# Patient Record
Sex: MALE | Race: WHITE
[De-identification: names, ages, dates, MRNs, and addresses within clinical notes are randomized per-mention and may not be internally consistent; named-entity substitution may affect disease eponyms.]

---

## 2018-07-29 ENCOUNTER — HOSPITAL ENCOUNTER (OUTPATIENT)
Dept: HOSPITAL 76 - ED | Age: 58
Setting detail: OBSERVATION
LOS: 1 days | Discharge: HOME | End: 2018-07-30
Attending: NURSE PRACTITIONER | Admitting: INTERNAL MEDICINE
Payer: MEDICARE

## 2018-07-29 ENCOUNTER — HOSPITAL ENCOUNTER (OUTPATIENT)
Dept: HOSPITAL 76 - EMS | Age: 58
Discharge: ANOTHER HOSPITAL | End: 2018-07-29
Attending: SURGERY
Payer: MEDICARE

## 2018-07-29 DIAGNOSIS — Z83.3: ICD-10-CM

## 2018-07-29 DIAGNOSIS — R73.9: ICD-10-CM

## 2018-07-29 DIAGNOSIS — N18.3: ICD-10-CM

## 2018-07-29 DIAGNOSIS — E03.9: ICD-10-CM

## 2018-07-29 DIAGNOSIS — R42: Primary | ICD-10-CM

## 2018-07-29 DIAGNOSIS — R11.0: ICD-10-CM

## 2018-07-29 DIAGNOSIS — T45.1X5A: ICD-10-CM

## 2018-07-29 DIAGNOSIS — Z79.82: ICD-10-CM

## 2018-07-29 DIAGNOSIS — Z79.899: ICD-10-CM

## 2018-07-29 DIAGNOSIS — Z94.4: ICD-10-CM

## 2018-07-29 DIAGNOSIS — H55.09: ICD-10-CM

## 2018-07-29 DIAGNOSIS — R11.2: Primary | ICD-10-CM

## 2018-07-29 DIAGNOSIS — R42: ICD-10-CM

## 2018-07-29 LAB
ALBUMIN DIAFP-MCNC: 4.2 G/DL (ref 3.2–5.5)
ALBUMIN/GLOB SERPL: 1.4 {RATIO} (ref 1–2.2)
ALP SERPL-CCNC: 53 IU/L (ref 42–121)
ALT SERPL W P-5'-P-CCNC: 42 IU/L (ref 10–60)
ANION GAP SERPL CALCULATED.4IONS-SCNC: 8 MMOL/L (ref 6–13)
AST SERPL W P-5'-P-CCNC: 38 IU/L (ref 10–42)
BASOPHILS NFR BLD AUTO: 0 10^3/UL (ref 0–0.1)
BASOPHILS NFR BLD AUTO: 0.4 %
BILIRUB BLD-MCNC: 2.7 MG/DL (ref 0.2–1)
BUN SERPL-MCNC: 26 MG/DL (ref 6–20)
CALCIUM UR-MCNC: 9.3 MG/DL (ref 8.5–10.3)
CHLORIDE SERPL-SCNC: 103 MMOL/L (ref 101–111)
CO2 SERPL-SCNC: 26 MMOL/L (ref 21–32)
CREAT SERPLBLD-SCNC: 1.4 MG/DL (ref 0.6–1.2)
EOSINOPHIL # BLD AUTO: 0.1 10^3/UL (ref 0–0.7)
EOSINOPHIL NFR BLD AUTO: 1 %
ERYTHROCYTE [DISTWIDTH] IN BLOOD BY AUTOMATED COUNT: 20.2 % (ref 12–15)
GFRSERPLBLD MDRD-ARVRAT: 52 ML/MIN/{1.73_M2} (ref 89–?)
GLOBULIN SER-MCNC: 3 G/DL (ref 2.1–4.2)
GLUCOSE SERPL-MCNC: 153 MG/DL (ref 70–100)
HGB UR QL STRIP: 11 G/DL (ref 14–18)
LIPASE SERPL-CCNC: 44 U/L (ref 22–51)
LYMPHOCYTES # SPEC AUTO: 0.6 10^3/UL (ref 1.5–3.5)
LYMPHOCYTES NFR BLD AUTO: 9.2 %
MCH RBC QN AUTO: 31.3 PG (ref 27–31)
MCHC RBC AUTO-ENTMCNC: 35 G/DL (ref 32–36)
MCV RBC AUTO: 89.5 FL (ref 80–94)
MONOCYTES # BLD AUTO: 0.3 10^3/UL (ref 0–1)
MONOCYTES NFR BLD AUTO: 4.9 %
NEUTROPHILS # BLD AUTO: 5.9 10^3/UL (ref 1.5–6.6)
NEUTROPHILS # SNV AUTO: 6.9 X10^3/UL (ref 4.8–10.8)
NEUTROPHILS NFR BLD AUTO: 84.5 %
PDW BLD AUTO: 8.3 FL (ref 7.4–11.4)
PLATELET # BLD: 134 10^3/UL (ref 130–450)
PLATELET BLD QL SMEAR: (no result)
PROT SPEC-MCNC: 7.2 G/DL (ref 6.7–8.2)
RBC MAR: 3.52 10^6/UL (ref 4.7–6.1)
RBC MORPH BLD: (no result)
SODIUM SERPLBLD-SCNC: 137 MMOL/L (ref 135–145)

## 2018-07-29 PROCEDURE — 96375 TX/PRO/DX INJ NEW DRUG ADDON: CPT

## 2018-07-29 PROCEDURE — 99284 EMERGENCY DEPT VISIT MOD MDM: CPT

## 2018-07-29 PROCEDURE — 96365 THER/PROPH/DIAG IV INF INIT: CPT

## 2018-07-29 PROCEDURE — 96372 THER/PROPH/DIAG INJ SC/IM: CPT

## 2018-07-29 PROCEDURE — 70551 MRI BRAIN STEM W/O DYE: CPT

## 2018-07-29 PROCEDURE — 85025 COMPLETE CBC W/AUTO DIFF WBC: CPT

## 2018-07-29 PROCEDURE — 85610 PROTHROMBIN TIME: CPT

## 2018-07-29 PROCEDURE — 96376 TX/PRO/DX INJ SAME DRUG ADON: CPT

## 2018-07-29 PROCEDURE — 84443 ASSAY THYROID STIM HORMONE: CPT

## 2018-07-29 PROCEDURE — 84100 ASSAY OF PHOSPHORUS: CPT

## 2018-07-29 PROCEDURE — 83735 ASSAY OF MAGNESIUM: CPT

## 2018-07-29 PROCEDURE — 99285 EMERGENCY DEPT VISIT HI MDM: CPT

## 2018-07-29 PROCEDURE — 96361 HYDRATE IV INFUSION ADD-ON: CPT

## 2018-07-29 PROCEDURE — 83036 HEMOGLOBIN GLYCOSYLATED A1C: CPT

## 2018-07-29 PROCEDURE — 83605 ASSAY OF LACTIC ACID: CPT

## 2018-07-29 PROCEDURE — 36415 COLL VENOUS BLD VENIPUNCTURE: CPT

## 2018-07-29 PROCEDURE — 80053 COMPREHEN METABOLIC PANEL: CPT

## 2018-07-29 PROCEDURE — 83690 ASSAY OF LIPASE: CPT

## 2018-07-29 RX ADMIN — SODIUM CHLORIDE SCH MLS/HR: 9 INJECTION, SOLUTION INTRAVENOUS at 21:38

## 2018-07-29 NOTE — ED PHYSICIAN DOCUMENTATION
History of Present Illness





- Stated complaint


Stated Complaint: VERTIGO





- Chief complaint


Chief Complaint: General





- History obtained from


History obtained from: Patient, Family, EMS





- History of Present Illness


Timing: Today


Pain level max: 0


Pain level now: 0


Improved by: rest


Worsened by: movement.





- Additonal information


Additional information: 





states history of vertigo. States felt sudden onset of vertigo today. Vomiting. 

Room spinning. No fevers. No focal weakness. Has a history of liver transplant 

in 2014.  Recently moved from [place]. worse with movement. Better lying still. 





Review of Systems


Ten Systems: 10 systems reviewed and negative


Constitutional: denies: Fever, Chills


Ears: denies: Ear pain


Nose: denies: Rhinorrhea / runny nose, Congestion


Throat: denies: Sore throat


Cardiac: denies: Chest pain / pressure


Respiratory: denies: Cough


GI: reports: Nausea, Vomiting.  denies: Abdominal Pain, Diarrhea


Skin: denies: Rash


Musculoskeletal: denies: Neck pain, Back pain


Neurologic: denies: Headache





PD PAST MEDICAL HISTORY





- Past Medical History


Past Medical History: Yes


GI: Other


Other Past Medical History: Liver transplant 2014





- Past Surgical History


General: Liver surgery





- Allergies


Allergies/Adverse Reactions: 


 Allergies











Allergy/AdvReac Type Severity Reaction Status Date / Time


 


No Known Drug Allergies Allergy   Verified 07/29/18 13:18














- Social History


Does the pt smoke?: No


Smoking Status: Never smoker


Does the pt drink ETOH?: No


Does the pt have substance abuse?: No





- Immunizations


Immunizations are current?: Yes





PD ED PE NORMAL





- Vitals


Vital signs reviewed: Yes





- General


General: Alert and oriented X 3, No acute distress





- HEENT


HEENT: Moist mucous membranes, Other (Horizontal nystagmus present.  Worsens 

with any movement)





- Neck


Neck: Supple, no meningeal sign





- Cardiac


Cardiac: RRR, Strong equal pulses





- Respiratory


Respiratory: No respiratory distress, Clear bilaterally





- Abdomen


Abdomen: Soft, Non tender, Non distended





- Back


Back: No spinal TTP





- Derm


Derm: Warm and dry





- Extremities


Extremities: No edema





- Neuro


Neuro: Alert and oriented X 3, CNs 2-12 intact, No motor deficit, No sensory 

deficit, Normal speech





- Psych


Psych: Normal mood, Normal affect





Results





- Vitals


Vitals: 


 Vital Signs - 24 hr











  07/29/18 07/29/18 07/29/18





  13:13 14:10 15:30


 


Temperature 35 C L  


 


Heart Rate 71 59 L 82


 


Respiratory 16 16 16





Rate   


 


Blood Pressure 150/83 H 149/73 H 134/69 H


 


O2 Saturation 99 100 100














  07/29/18 07/29/18 07/29/18





  16:59 18:00 18:58


 


Temperature   


 


Heart Rate 82 78 81


 


Respiratory 16  15





Rate   


 


Blood Pressure 125/71 127/77 122/80


 


O2 Saturation 97 97 97














  07/29/18





  19:17


 


Temperature 


 


Heart Rate 85


 


Respiratory 16





Rate 


 


Blood Pressure 122/84 H


 


O2 Saturation 97








 Oxygen











O2 Source                      Room air

















- Labs


Labs: 


 Laboratory Tests











  07/29/18 07/29/18





  13:53 13:53


 


WBC  6.9 


 


RBC  3.52 L 


 


Hgb  11.0 L 


 


Hct  31.5 L 


 


MCV  89.5 


 


MCH  31.3 H 


 


MCHC  35.0 


 


RDW  20.2 H 


 


Plt Count  134 


 


MPV  8.3 


 


Neut # (Auto)  5.9 


 


Lymph # (Auto)  0.6 L 


 


Mono # (Auto)  0.3 


 


Eos # (Auto)  0.1 


 


Baso # (Auto)  0.0 


 


Absolute Nucleated RBC  0.00 


 


Nucleated RBC %  0.0 


 


Manual Slide Review  Indicated 


 


Platelet Estimate  NORMAL (130-450,000) 


 


RBC Morph Micro Appear  1+ MICROCYTOSIS 


 


Sodium   137


 


Potassium   3.8


 


Chloride   103


 


Carbon Dioxide   26


 


Anion Gap   8.0


 


BUN   26 H


 


Creatinine   1.4 H


 


Estimated GFR (MDRD)   52 L


 


Glucose   153 H


 


Calcium   9.3


 


Total Bilirubin   2.7 H


 


AST   38


 


ALT   42


 


Alkaline Phosphatase   53


 


Total Protein   7.2


 


Albumin   4.2


 


Globulin   3.0


 


Albumin/Globulin Ratio   1.4


 


Lipase   44














PD MEDICAL DECISION MAKING





- ED course


Complexity details: reviewed results, re-evaluated patient, considered 

differential, d/w patient, d/w family, d/w consultant


ED course: 





Patient is a 57-year-old male who has a long-standing history of vertigo.  

Symptoms today are consistent with peripheral vertigo.  Nystagmus is 

horizontal.  Was given IV Ativan, Phenergan, Benadryl.  His nausea improved, 

but still has significant nights nystagmus.  Still is very nauseated when he 

opens his eyes.  Head CT was ordered, but the CT scanner went down unexpectedly 

and this cannot be performed today. MRI would likely be a better study anyway 

for potential cerebellar issues.  As he is improving with medications, we will 

place him in observation with the hospitalist and have him reevaluated after 

receiving more medications.  Concerned that he will not be able to keep his anti

-rejection medications down. Discussed the case with Dr. Romano who accepts





This document was made in part using voice recognition software. While efforts 

are made to proofread this document, sound alike and grammatical errors may 

occur.





- Sepsis Event


Vital Signs: 


 Vital Signs - 24 hr











  07/29/18 07/29/18 07/29/18





  13:13 14:10 15:30


 


Temperature 35 C L  


 


Heart Rate 71 59 L 82


 


Respiratory 16 16 16





Rate   


 


Blood Pressure 150/83 H 149/73 H 134/69 H


 


O2 Saturation 99 100 100














  07/29/18 07/29/18 07/29/18





  16:59 18:00 18:58


 


Temperature   


 


Heart Rate 82 78 81


 


Respiratory 16  15





Rate   


 


Blood Pressure 125/71 127/77 122/80


 


O2 Saturation 97 97 97














  07/29/18





  19:17


 


Temperature 


 


Heart Rate 85


 


Respiratory 16





Rate 


 


Blood Pressure 122/84 H


 


O2 Saturation 97








 Oxygen











O2 Source                      Room air

















Departure





- Departure


Disposition: ED Place in Observation


Clinical Impression: 


 Vertigo





Vomiting


Qualifiers:


 Vomiting type: unspecified Vomiting Intractability: intractable Nausea presence

: with nausea Qualified Code(s): R11.2 - Nausea with vomiting, unspecified





Condition: Stable

## 2018-07-29 NOTE — HISTORY & PHYSICAL EXAMINATION
Chief Complaint





- Chief Complaint


Chief Complaint: Vertigo, nausea and vomiting





History of Present Illness





- Admitted From


Admitted From:: Emergency department





- History Obtained From


Records Reviewed: Yes


History obtained from: Patient


Exam Limitations: None





- History of Present Illness


HPI Comment/Other: 





Patient is a's 57-year-old gentleman with a past medical history significant 

for liver failure of unknown etiology status post liver transplant in  at 

[facility] with complication of bile duct blockage 

requiring stents every 3 months until earlier this year when the patient had a 

bypass of the bile duct, tacrolimus-induced chronic kidney disease and 

hypothyroidism who presented to the emergency department with a chief complaint 

of vertigo, nausea and vomiting.  The patient states that he was in his normal 

state of health until about 1230 this afternoon when he had just finished 

working on his computer and walked into the bathroom.  He states that he use 

the bathroom and then zipped up his pants when he began to notice that the room 

was spinning around him.  He states this was very severe and he had to hold 

onto the toilet just to keep himself from falling over.  He states he became 

nauseated and had emesis.  His wife called 911 and the paramedics came to the 

scene.  The patient states that he has had a similar episode once before 

earlier this year.  He states that at that time he had the exact same symptoms 

and also had to call 911 but his symptoms resolved very quickly after he 

received Zofran and IV fluids.  He states that he did have to stay overnight at 

the hospital in [place], [place] but had no further symptoms.  The patient 

denies having had any fevers or chills.  The patient denies any ringing in his 

ear or recent upper respiratory infection.  The patient describes vertigo that 

is worse with certain positions.  He states at this point the vertigo is so bad 

he cannot even open his eyes because he will become nauseated and vomit.  The 

patient denies any headache or any focal neurologic deficits.  The patient 

denies any abdominal pain, diarrhea or any jaundice.  Patient denies any black 

or tarry stools.  The patient denies any pale stools.





The patient denies any blurred vision, runny nose, sore throat, nasal congestion

, difficulty swallowing, chest pain, cough, shortness of air, orthopnea, PND, 

increased lower extremity swelling, urinary urgency, urinary frequency, dysuria

, joint pain, joint swelling, muscle aches, back pain, neck stiffness, recent 

unintentional weight loss, changes in his appetite, hair loss, skin rash, night 

sweats, polyuria or polydipsia.





The patient states that he has recently moved to [place] where his son lives 

and is in the [employer].  He states that his previous care for liver transplant was 

at [facility] and since moving here he has had an appointment at the [facility] with their transplant team.  The patient states that he is stable 

from the issue of his transplant and only requires follow-up once a year with 

the transplant team.  Otherwise he follows up with his primary care physician 

for routine lab work.  The patient states that ever since his liver transplant 

his bilirubin has been slightly elevated but has remained stable.





On presentation to the emergency department the patient was afebrile and 

slightly hypertensive but otherwise had normal vital signs.  The patient was in 

severe distress as he could not keep his eyes open due to vertigo and severe 

nausea.  The patient was treated in the emergency department with IV fluids, IV 

Zofran, IV Phenergan, IV Benadryl, IV Ativan and oral meclizine.  The patient 

had almost no response to all the medication he received in the emergency 

department.  As soon as the patient opens his eyes he begins to have severe 

vertigo and becomes nauseated and vomits.  The patient's lab work revealed a 

creatinine of 1.4, the patient is known to have chronic kidney disease but did 

have an elevated BUN of 26 and appeared to be dry on exam.  The patient's 

bilirubin was elevated at 2.7 although we do not have a baseline bilirubin the 

patient does admit to having elevated bilirubin since his transplant.  The 

patient was also hyperglycemic with a blood glucose of 153, he states he has 

never been told he has diabetes or borderline diabetes but does have a family 

history of diabetes.  The patient was slightly anemic but otherwise had no 

leukocytosis and remainder of his electrolytes were within normal limits.  

Given that after 6 hours in the emergency department the patient continued to 

have symptoms despite multiple rounds of medication it was decided that the 

patient should be placed in observation for supportive care and further 

management of his symptoms.





History





- Past Medical History


Endocrine/Autoimmune: reports: HyPOthyroidism


GI: reports: Other (Liver transplant in 2014 for Liver failure of unknown 

etiology, s/p bile duct bypass )


: reports: Renal insuffiency


MRSA Hx?: No


Other Past Medical History: Liver transplant 2014





- Past Surgical History


General: reports: Liver surgery





- Family & Social History


Family History: Father: , CAD, CVA/TIA, Diabetes, Type 2, MI, Brother: 

Diabetes, Type 2


Living arrangement: At home


Living Situation: With spouse/s.o., With family


Social History Notes: The patient currently lives in [place] with his wife 

and his son.  Patient's son is in the [place] and had a large home therefore the 

patient and his wife decided to move up to live with her son.  Prior to this 

the patient and his wife live near [place], [place] and the patient was seen at 

[facility] for his transplant care.  The patient has established care at the 

[facility] for his liver transplant since moving here 2 [place].  The patient is retired [employer].  He denies any tobacco use, alcohol use 

or any drug use.





- POLST


Patient has POLST: No


POLST Status: Full Code





Meds/Allgy





- Home Medications


Home Medications: 


 Ambulatory Orders











 Medication  Instructions  Recorded  Confirmed


 


Aspirin [Aspirin EC] 81 mg PO DAILY 18


 


Calcium Carbonate [[id number]] 500 mg PO TID 18


 


Cholecalciferol (Vitamin D3) 2,000 unit PO DAILY 18





[Vitamin D]   


 


Levothyroxine Sodium [Synthroid] 175 mg PO QDAC 18


 


Magnesium Oxide 400 mg PO DAILY 18


 


Multivitamin [Multivitamins] 1 tab PO DAILY 18


 


Tacrolimus [Prograf] 2 mg PO BID 18


 


azaTHIOprine [Azathioprine] 100 mg PO QPM 18


 


oxyCODONE [Roxicodone] 5 mg PO Q6H PRN 18














- Allergies


Allergies/Adverse Reactions: 


 Allergies











Allergy/AdvReac Type Severity Reaction Status Date / Time


 


No Known Drug Allergies Allergy   Verified 18 13:18














Review of Systems





- Other Findings


Other Findings: 





A comprehensive review of systems was performed the pertinent positives and 

negatives are stated above in the HPI and the remainder of the review of 

systems is negative.





Exam





- Vital Signs


Reviewed Vital Signs: Yes


Vital Signs: 





 Vital Signs x48h











  Temp Pulse Resp BP Pulse Ox


 


 18 19:17   85  16  122/84 H  97


 


 18 18:58   81  15  122/80  97


 


 18 18:00   78   127/77  97


 


 18 16:59   82  16  125/71  97


 


 18 15:30   82  16  134/69 H  100


 


 18 14:10   59 L  16  149/73 H  100


 


 18 13:13  35 C L  71  16  150/83 H  99














- Physical Exam


General Appearance: positive: Alert, Moderate distress (Patient keeping his 

eyes closed secondary to severe vertigo and nausea)


Eyes Bilateral: positive: Normal inspection, PERRL, EOMI, No lid inflammation, 

Conjunctivae nml, No scleral icterus, Other (Horizontal nystagmus)


ENT: positive: ENT inspection nml, Pharynx nml, Dry mucous membranes.  negative

: Purulent nasal drainage, Pharyngeal erythema, Oral lesions


Neck: positive: Nml inspection, Thyroid nml, No JVD, Trachea midline.  negative

: Thyromegaly, Lymphadenopathy (R), Lymphadenopathy (L), Carotid bruit, 

Tracheal deviation


Respiratory: positive: Chest non-tender, No respiratory distress, Breath sounds 

nml.  negative: Wheezes, Rales, Rhonchi


Cardiovascular: positive: Regular rate & rhythm, No murmur, No gallop


Peripheral Pulses: positive: 2+


Abdomen: positive: Non-tender, No organomegaly, Nml bowel sounds, No 

distention.  negative: Guarding, Rebound, Hepatomegaly


Back: positive: Nml inspection.  negative: CVA tenderness (R), CVA tenderness (L

)


Skin: positive: Color nml, No rash, Warm, Dry.  negative: Cyanosis, Diaphoresis

, Pallor, Skin rash


Extremities: positive: Non-tender, Full ROM, Nml appearance, No pedal edema


Neurologic/Psychiatric: positive: Oriented x3, CN's nml (2-12), Motor nml, 

Sensation nml, Mood/affect nml





Conclusion/Plan





- Problem List


(1) Intractable nausea and vomiting


Conclusion/Plan: 





The patient presented to the emergency department secondary to vertigo, nausea 

and vomiting.  The patient appears to have intractable nausea and vomiting as 

we were unable to control his nausea and vomiting while he was in emergency 

department despite multiple treatments with medication.  The patient's 

intractable nausea vomiting appears likely to be secondary to his vertigo.  The 

patient does not appear to have any gastroenteritis or abdominal etiology of 

his nausea and vomiting.  The patient has no diarrhea and no abdominal pain.  

The patient does have a mildly elevated bilirubin however according to him this 

is elevated at baseline.  This does not appear to be rejection of his 

transplant.





Plan:


We will monitor patient closely for development of any abdominal pain or any 

symptoms of transplant rejection.  We will monitor his labs closely and keep an 

eye on his LFTs and bilirubin.


Patient will be given supportive care with IV antiemetics and IV fluids


We will treat patient's vertigo with meclizine


Qualifiers: 


   Vomiting type: unspecified   Qualified Code(s): R11.2 - Nausea with vomiting

, unspecified   





(2) Vertigo


Conclusion/Plan: 





The patient presented to the emergency department with severe uncontrolled 

vertigo.  The patient's vertigo was so severe that he could not open his eyes 

without becoming nauseated and vomiting.  The patient had no recent upper 

respiratory infection, ringing in his ears or any fevers or chills.  The patient

's vertigo is made worse with certain positions.  The patient appears to have 

benign paroxysmal positional vertigo.  The patient had horizontal nystagmus and 

no rotatory or vertical nystagmus therefore is very unlikely that this is a 

central vertigo.  This appears most likely to be a peripheral vertigo.  A CT 

scan was ordered in the emergency department but our CT scanner is currently 

down therefore no CT was performed.  The patient does not have any focal 

deficits.  The patient has had this type of similar vertigo in the past.





Plan:


P.o. meclizine


IV antiemetics


IV fluids


Monitor closely for any development of signs to suggest central vertigo if 

patient does develop any signs or symptoms we will consider a CT of his head at 

that time.








(3) History of liver transplant


Conclusion/Plan: 


Patient able to tolerate oral meds when he keeps his eyes closed but vomits 

when he opens his eyes.  Patient advised to take his immunosuppressive 

medications for his history of liver transplant while his eyes were closed and 

keep them closed for some time to allow for absorption.  The patient states 

that he has had chronically elevated bilirubin since his transplant although he 

does not know the exact number.  His bilirubin is slightly abnormal at 2.7 it 

is assumed that this is close to his chronically elevated number.


We will monitor patient's bilirubin and LFTs while he is hospitalized.








(4) Hyperglycemia


Conclusion/Plan: 


On presentation to the emergency department the patient's blood glucose is 

elevated at 153.  The patient does not have any history of diabetes.  We do not 

have any previous blood glucose on the patient.  We will check a hemoglobin A1c 

and monitor his blood glucose daily.  If his A1c is elevated we will consider 

placing him on sliding scale insulin.  The patient does not appear to be having 

DKA or symptoms secondary to the hyperglycemia.  His blood glucose may just be 

elevated secondary to stress response from intractable nausea vomiting.








(5) CKD (chronic kidney disease) stage 3, GFR 30-59 ml/min


Conclusion/Plan: 





The patient has a history of tacrolimus-induced chronic kidney disease.  The 

patient's creatinine is elevated at 1.4 on presentation and his BUN is slightly 

elevated at 26.  We do not have a baseline creatinine on the patient therefore 

is not clear if this is his baseline or if he has some acute on chronic renal 

failure.  The patient will be given IV fluids and we will monitor his 

creatinine while he is hospitalized.  We will avoid any nephrotoxic agents.








(6) Hypothyroidism


Conclusion/Plan: 


Patient has a history of hypothyroidism and is on Synthroid at home.  The 

patient's Synthroid dose will be continued while he is hospitalized.  The 

patient's TSH will be checked in the morning.  The patient appears stable from 

the standpoint of hypothyroidism.


Qualifiers: 


   Hypothyroidism type: unspecified   Qualified Code(s): E03.9 - Hypothyroidism

, unspecified   





- Lab Results


Lab results reviewed: Yes


Fish Bones: 


 18 13:53





 18 13:53


Other Lab Results: 








 Laboratory Results











WBC  6.9 x10^3/uL (4.8-10.8)   18  13:53    


 


RBC  3.52 10^6/uL (4.70-6.10)  L  18  13:53    


 


Hgb  11.0 g/dL (14.0-18.0)  L  18  13:53    


 


Hct  31.5 % (42.0-52.0)  L  18  13:53    


 


MCV  89.5 fL (80.0-94.0)   18  13:53    


 


MCH  31.3 pg (27.0-31.0)  H  18  13:53    


 


MCHC  35.0 g/dL (32.0-36.0)   18  13:53    


 


RDW  20.2 % (12.0-15.0)  H  18  13:53    


 


Plt Count  134 10^3/uL (130-450)   18  13:53    


 


MPV  8.3 fL (7.4-11.4)   18  13:53    


 


Neut # (Auto)  5.9 10^3/uL (1.5-6.6)   18  13:53    


 


Lymph # (Auto)  0.6 10^3/uL (1.5-3.5)  L  18  13:53    


 


Mono # (Auto)  0.3 10^3/uL (0.0-1.0)   18  13:53    


 


Eos # (Auto)  0.1 10^3/uL (0.0-0.7)   18  13:53    


 


Baso # (Auto)  0.0 10^3/uL (0.0-0.1)   18  13:53    


 


Absolute Nucleated RBC  0.00 x10^3/uL  18  13:53    


 


Nucleated RBC %  0.0 /100WBC  18  13:53    


 


Manual Slide Review  Indicated   18  13:53    


 


Platelet Estimate  NORMAL (130-450,000)  (NORMAL)   18  13:53    


 


RBC Morph Micro Appear  2+  ANISOCYTOSIS  (NORMAL) 1+ MICROCYTOSIS  (NORMAL)   

18  13:53    


 


RBC Morph Micro Appear  2+  ANISOCYTOSIS  (NORMAL) 1+ MICROCYTOSIS  (NORMAL)   

18  13:53    


 


Sodium  137 mmol/L (135-145)   18  13:53    


 


Potassium  3.8 mmol/L (3.5-5.0)   18  13:53    


 


Chloride  103 mmol/L (101-111)   18  13:53    


 


Carbon Dioxide  26 mmol/L (21-32)   18  13:53    


 


Anion Gap  8.0  (6-13)   18  13:53    


 


BUN  26 mg/dL (6-20)  H  18  13:53    


 


Creatinine  1.4 mg/dL (0.6-1.2)  H  18  13:53    


 


Estimated GFR (MDRD)  52  (>89)  L  18  13:53    


 


Glucose  153 mg/dL ()  H  18  13:53    


 


Calcium  9.3 mg/dL (8.5-10.3)   18  13:53    


 


Total Bilirubin  2.7 mg/dL (0.2-1.0)  H  18  13:53    


 


AST  38 IU/L (10-42)   18  13:53    


 


ALT  42 IU/L (10-60)   18  13:53    


 


Alkaline Phosphatase  53 IU/L ()   18  13:53    


 


Total Protein  7.2 g/dL (6.7-8.2)   18  13:53    


 


Albumin  4.2 g/dL (3.2-5.5)   18  13:53    


 


Globulin  3.0 g/dL (2.1-4.2)   18  13:53    


 


Albumin/Globulin Ratio  1.4  (1.0-2.2)   18  13:53    


 


Lipase  44 U/L (22-51)   18  13:53    














Core Measures





- Anticipated LOS


I expect patient to be DC'd or transferred within 96 hours.: Yes





- DVT/VTE - Prophylaxis


VTE/DVT Prophylaxis med ordered at admit?: Yes

## 2018-07-30 VITALS — DIASTOLIC BLOOD PRESSURE: 78 MMHG | SYSTOLIC BLOOD PRESSURE: 158 MMHG

## 2018-07-30 LAB
ALBUMIN DIAFP-MCNC: 4 G/DL (ref 3.2–5.5)
ALBUMIN/GLOB SERPL: 1.6 {RATIO} (ref 1–2.2)
ALP SERPL-CCNC: 48 IU/L (ref 42–121)
ALT SERPL W P-5'-P-CCNC: 38 IU/L (ref 10–60)
ANION GAP SERPL CALCULATED.4IONS-SCNC: 5 MMOL/L (ref 6–13)
AST SERPL W P-5'-P-CCNC: 33 IU/L (ref 10–42)
BASOPHILS NFR BLD AUTO: 0 10^3/UL (ref 0–0.1)
BASOPHILS NFR BLD AUTO: 0.4 %
BILIRUB BLD-MCNC: 2.3 MG/DL (ref 0.2–1)
BUN SERPL-MCNC: 23 MG/DL (ref 6–20)
CALCIUM UR-MCNC: 8.9 MG/DL (ref 8.5–10.3)
CHLORIDE SERPL-SCNC: 106 MMOL/L (ref 101–111)
CO2 SERPL-SCNC: 30 MMOL/L (ref 21–32)
CREAT SERPLBLD-SCNC: 1.4 MG/DL (ref 0.6–1.2)
EOSINOPHIL # BLD AUTO: 0.1 10^3/UL (ref 0–0.7)
EOSINOPHIL NFR BLD AUTO: 1.2 %
ERYTHROCYTE [DISTWIDTH] IN BLOOD BY AUTOMATED COUNT: 19.9 % (ref 12–15)
EST. AVERAGE GLUCOSE BLD GHB EST-MCNC: 62 MG/DL (ref 70–100)
GFRSERPLBLD MDRD-ARVRAT: 52 ML/MIN/{1.73_M2} (ref 89–?)
GLOBULIN SER-MCNC: 2.5 G/DL (ref 2.1–4.2)
GLUCOSE SERPL-MCNC: 108 MG/DL (ref 70–100)
HB2 TOTAL: 11 G/DL
HBA1C BLD-MCNC: 0.2 G/DL
HEMOGLOBIN A1C %: 3.8 % (ref 4.6–6.2)
HGB UR QL STRIP: 10.9 G/DL (ref 14–18)
INR PPP: 1.1 (ref 0.8–1.2)
LYMPHOCYTES # SPEC AUTO: 0.6 10^3/UL (ref 1.5–3.5)
LYMPHOCYTES NFR BLD AUTO: 9 %
MAGNESIUM SERPL-MCNC: 1.8 MG/DL (ref 1.7–2.8)
MCH RBC QN AUTO: 31.8 PG (ref 27–31)
MCHC RBC AUTO-ENTMCNC: 34.6 G/DL (ref 32–36)
MCV RBC AUTO: 91.9 FL (ref 80–94)
MONOCYTES # BLD AUTO: 0.3 10^3/UL (ref 0–1)
MONOCYTES NFR BLD AUTO: 4.4 %
NEUTROPHILS # BLD AUTO: 5.3 10^3/UL (ref 1.5–6.6)
NEUTROPHILS # SNV AUTO: 6.2 X10^3/UL (ref 4.8–10.8)
NEUTROPHILS NFR BLD AUTO: 85 %
PDW BLD AUTO: 8.3 FL (ref 7.4–11.4)
PHOSPHATE BLD-MCNC: 3.6 MG/DL (ref 2.5–4.6)
PLATELET # BLD: 117 10^3/UL (ref 130–450)
PROT SPEC-MCNC: 6.5 G/DL (ref 6.7–8.2)
PROTHROM ACT/NOR PPP: 12.6 SECS (ref 9.9–12.6)
RBC MAR: 3.42 10^6/UL (ref 4.7–6.1)
SODIUM SERPLBLD-SCNC: 141 MMOL/L (ref 135–145)

## 2018-07-30 RX ADMIN — SODIUM CHLORIDE, PRESERVATIVE FREE SCH ML: 5 INJECTION INTRAVENOUS at 13:21

## 2018-07-30 RX ADMIN — SODIUM CHLORIDE, PRESERVATIVE FREE SCH: 5 INJECTION INTRAVENOUS at 08:30

## 2018-07-30 RX ADMIN — SODIUM CHLORIDE, PRESERVATIVE FREE SCH: 5 INJECTION INTRAVENOUS at 04:09

## 2018-07-30 RX ADMIN — MECLIZINE HYDROCHLORIDE PRN MG: 12.5 TABLET ORAL at 08:27

## 2018-07-30 RX ADMIN — MECLIZINE HYDROCHLORIDE PRN MG: 12.5 TABLET ORAL at 13:20

## 2018-07-30 RX ADMIN — SODIUM CHLORIDE SCH MLS/HR: 9 INJECTION, SOLUTION INTRAVENOUS at 07:43

## 2018-07-30 NOTE — DISCHARGE SUMMARY
Discharge Summary


Discharge Date: 07/30/18


Discharging Provider: RADHA MORTENSEN


Condition at Discharge: Poor


Discharge Disposition: 01 Home, Self Care


Discharge Facility Name: home





- DIAGNOSES


Admission Diagnoses: 


(1) Intractable nausea and vomiting





(2) Vertigo








(3) History of liver transplant








(4) Hyperglycemia








(5) CKD (chronic kidney disease) stage 3, GFR 30-59 ml/min








(6) Hypothyroidism





Discharge Diagnoses with Status of Each Condition: 


(1) Intractable nausea and vomiting


resolved. pt is prescribed antiemesis PRN to home. pt is happy to be d/c to home


(2) Vertigo


resolved. pt is prescribed antiverb PRN for home. MRI of head is unremarkable





(3) History of liver transplant


stable, continue home regimen


(4) Hyperglycemia


stable





(5) CKD (chronic kidney disease) stage 3, GFR 30-59 ml/min


stable





(6) Hypothyroidism


stable





- HPI


History of Present Illness: 


refer from Dr. Romano's HPI as the following:


Patient is a's 57-year-old gentleman with a past medical history significant 

for liver failure of unknown etiology status post liver transplant in 2014 at 

[facility] with complication of bile duct blockage 

requiring stents every 3 months until earlier this year when the patient had a 

bypass of the bile duct, tacrolimus-induced chronic kidney disease and 

hypothyroidism who presented to the emergency department with a chief complaint 

of vertigo, nausea and vomiting.  The patient states that he was in his normal 

state of health until about 1230 this afternoon when he had just finished 

working on his computer and walked into the bathroom.  He states that he use 

the bathroom and then zipped up his pants when he began to notice that the room 

was spinning around him.  He states this was very severe and he had to hold 

onto the toilet just to keep himself from falling over.  He states he became 

nauseated and had emesis.  His wife called 911 and the paramedics came to the 

scene.  The patient states that he has had a similar episode once before 

earlier this year.  He states that at that time he had the exact same symptoms 

and also had to call 911 but his symptoms resolved very quickly after he 

received Zofran and IV fluids.  He states that he did have to stay overnight at 

the hospital in [place], [place] but had no further symptoms.  The patient 

denies having had any fevers or chills.  The patient denies any ringing in his 

ear or recent upper respiratory infection.  The patient describes vertigo that 

is worse with certain positions.  He states at this point the vertigo is so bad 

he cannot even open his eyes because he will become nauseated and vomit.  The 

patient denies any headache or any focal neurologic deficits.  The patient 

denies any abdominal pain, diarrhea or any jaundice.  Patient denies any black 

or tarry stools.  The patient denies any pale stools.





The patient denies any blurred vision, runny nose, sore throat, nasal congestion

, difficulty swallowing, chest pain, cough, shortness of air, orthopnea, PND, 

increased lower extremity swelling, urinary urgency, urinary frequency, dysuria

, joint pain, joint swelling, muscle aches, back pain, neck stiffness, recent 

unintentional weight loss, changes in his appetite, hair loss, skin rash, night 

sweats, polyuria or polydipsia.





The patient states that he has recently moved to [place] where his son lives 

and is in the [employer].  He states that his previous care for liver transplant was 

at [facility] and since moving here he has had an appointment at the [facility] with their transplant team.  The patient states that he is stable 

from the issue of his transplant and only requires follow-up once a year with 

the transplant team.  Otherwise he follows up with his primary care physician 

for routine lab work.  The patient states that ever since his liver transplant 

his bilirubin has been slightly elevated but has remained stable.





On presentation to the emergency department the patient was afebrile and 

slightly hypertensive but otherwise had normal vital signs.  The patient was in 

severe distress as he could not keep his eyes open due to vertigo and severe 

nausea.  The patient was treated in the emergency department with IV fluids, IV 

Zofran, IV Phenergan, IV Benadryl, IV Ativan and oral meclizine.  The patient 

had almost no response to all the medication he received in the emergency 

department.  As soon as the patient opens his eyes he begins to have severe 

vertigo and becomes nauseated and vomits.  The patient's lab work revealed a 

creatinine of 1.4, the patient is known to have chronic kidney disease but did 

have an elevated BUN of 26 and appeared to be dry on exam.  The patient's 

bilirubin was elevated at 2.7 although we do not have a baseline bilirubin the 

patient does admit to having elevated bilirubin since his transplant.  The 

patient was also hyperglycemic with a blood glucose of 153, he states he has 

never been told he has diabetes or borderline diabetes but does have a family 

history of diabetes.  The patient was slightly anemic but otherwise had no 

leukocytosis and remainder of his electrolytes were within normal limits.  

Given that after 6 hours in the emergency department the patient continued to 

have symptoms despite multiple rounds of medication it was decided that the 

patient should be placed in observation for supportive care and further 

management of his symptoms.











- ALLERGIES


Allergies/Adverse Reactions: 


 Allergies











Allergy/AdvReac Type Severity Reaction Status Date / Time


 


No Known Drug Allergies Allergy   Verified 07/29/18 13:18














- MEDICATIONS


Home Medications: 


 Ambulatory Orders











 Medication  Instructions  Recorded  Confirmed


 


Aspirin [Aspirin EC] 81 mg PO DAILY 07/29/18 07/29/18


 


Calcium Carbonate [[id number]] 500 mg PO TID 07/29/18 07/29/18


 


Cholecalciferol (Vitamin D3) 2,000 unit PO DAILY 07/29/18 07/29/18





[Vitamin D3]   


 


Levothyroxine Sodium [Synthroid] 175 mg PO QDAC 07/29/18 07/29/18


 


Magnesium Oxide 400 mg PO DAILY 07/29/18 07/29/18


 


Multivitamin [Multivitamins] 1 tab PO DAILY 07/29/18 07/29/18


 


Tacrolimus [Prograf] 2 mg PO BID 07/29/18 07/29/18


 


azaTHIOprine [Azathioprine] 100 mg PO QPM 07/29/18 07/29/18


 


oxyCODONE [Roxicodone] 5 mg PO Q6H PRN 07/29/18 07/29/18


 


Meclizine [Antivert] 25 mg PO Q6HR PRN #25 tablet 07/30/18 


 


Prochlorperazine Maleate 10 mg PO Q6H PRN #15 tablet 07/30/18 





[Compazine]   














- PHYSICAL EXAM AT DISCHARGE


General Appearance: positive: No acute distress, Alert.  negative: Lethargic


Eyes Bilateral: positive: Normal inspection, PERRL, No lid inflammation, 

Conjunctivae nml


ENT: positive: ENT inspection nml, Pharynx nml, No signs of dehydration.  

negative: Purulent nasal drainage, Pharyngeal erythema, Oral lesions


Neck: positive: Nml inspection, Thyroid nml, No JVD, Trachea midline.  negative

: Thyromegaly, Lymphadenopathy (R), Lymphadenopathy (L), Stiff neck, Swelling/

bruising, Tracheal deviation


Respiratory: positive: Chest non-tender, No respiratory distress, Breath sounds 

nml.  negative: Wheezes, Rales, Rhonchi


Cardiovascular: positive: Regular rate & rhythm, No murmur, No gallop.  negative

: Irregularly irregular, Extrasystoles, Tachycardia, Bradycardia, JVD present, 

Systolic murmur, Diastolic murmur


Peripheral Pulses: positive: 2+


Abdomen: positive: Non-tender, No organomegaly, Nml bowel sounds, No 

distention.  negative: Tenderness, Guarding, Rebound


Back: positive: Nml inspection.  negative: CVA tenderness (R), CVA tenderness (L

)


Skin: positive: Color nml, No rash, Warm, Dry.  negative: Cyanosis, Diaphoresis

, Pallor


Extremities: positive: Non-tender, Full ROM, Nml appearance.  negative: Calf 

tenderness, Joint swelling, Sharad's sign/cords


Neurologic/Psychiatric: positive: Oriented x3, Motor nml, Sensation nml.  

negative: Mood/affect nml, Weakness, Sensory loss, Facial droop, Slurred/abnml 

speech, Depressed mood/affect





- LABS


Result Diagrams: 


 07/30/18 05:49





 07/30/18 05:49





- FOLLOW UP


Follow Up: 


You may follow up your PCP in one week. Should your symptoms return or worsen, 

you may present ER or call 911 for help.








- TIME SPENT


Time Spent in Discharge (Minutes): 45

## 2018-07-30 NOTE — DISCHARGE PLAN
Discharge Plan


Disposition: 01 Home, Self Care


Condition: Poor


Prescriptions: 


Meclizine [Antivert] 25 mg PO Q6HR PRN #25 tablet


 PRN Reason: Dizziness


Prochlorperazine Maleate [Compazine] 10 mg PO Q6H PRN #15 tablet


 PRN Reason: Nausea / Vomiting


Diet: Regular


Activity Restrictions: Activity as Tolerated


Shower Restrictions: No (fall precaution)


Instruction Topics:  Meclizine tablets or capsules, Prochlorperazine tablets, 

Dizziness Vertigo Balance Safety


Additional Instructions or Follow Up instructions: 


You may follow up your PCP in one week. Should your symptoms return or worsen, 

you may present ER or call 911 for help.


No Smoking: If you smoke, Please STOP!  Call [phone] for help.


Follow-up with: 


Provider,Other [Primary Care Provider] -

## 2018-07-30 NOTE — MRI REPORT
Procedure Date:  07/30/2018   

Accession Number:  [id number] / [id number]                    

Procedure:  MRI - Brain W/O CPT Code:  

 

FULL RESULT:

 

 

EXAM:

MRI BRAIN WITHOUT CONTRAST

 

EXAM DATE: 7/30/2018 01:12 PM.

 

CLINICAL HISTORY: Severe vertigo, evaluate for stroke.

 

COMPARISON: None available.

 

TECHNIQUE: Multiplanar, multisequence T1-weighted and fluid-sensitive MR 

sequences of the brain were performed. Sequences optimized for routine 

evaluation. Other: None. IV Contrast: None.

 

FINDINGS:

 

The diffusion-weighted images are normal. There is no evidence of acute 

or subacute cerebral infarction.

 

The corpus callosum is of normal size and configuration. The pituitary 

and sella are normal.  The craniocervical junction is normal.

 

There is minimal mucosal thickening in the ethmoid air cells. There is no 

obstructing pattern of sinus disease.

 

The cerebral vascular flow voids are patent.

 

The T2* sequence is normal. There is no evidence of subacute or chronic 

hemorrhage.

 

The FLAIR images demonstrate a few punctuate T2 hyperintensities within 

the subcortical, deep, and periventricular white matter. This is 

consistent with a mild degree of chronic small vessel ischemia.

 

The bilateral parotid spaces exhibit normal signal intensity.

Impression:

1. There is no evidence of acute or subacute cerebral infarction.

2. There is mild degree of chronic small vessel ischemia.

3. There is no evidence of brain mass.